# Patient Record
Sex: MALE | Race: WHITE
[De-identification: names, ages, dates, MRNs, and addresses within clinical notes are randomized per-mention and may not be internally consistent; named-entity substitution may affect disease eponyms.]

---

## 2018-05-02 ENCOUNTER — HOSPITAL ENCOUNTER (OUTPATIENT)
Dept: HOSPITAL 39 - RAD | Age: 53
End: 2018-05-02
Payer: COMMERCIAL

## 2018-05-02 DIAGNOSIS — J45.909: Primary | ICD-10-CM

## 2018-05-03 NOTE — RAD
EXAM DESCRIPTION:

Chest,3 Views



CLINICAL HISTORY:

52 years Male, ASTHMA, INTRINSIC



COMPARISON:

None.



TECHNIQUE/FINDINGS:

PA and lateral radiographs. Bilateral shallow oblique images with

nipple markers.



IMPRESSION:

Nipple markers on the oblique images indicate that the nodular

densities are nipples. No abnormal nodules in the lower lung

fields. Minimal prominence of interstitial markings in the lungs

bilaterally. No acute infiltrate. Cardiopulmonary vascular

structures and mediastinal silhouette is negative.



Electronically signed by:  Xavier Rhodes MD  5/3/2018 3:07 PM CDT

Workstation: [id number]